# Patient Record
(demographics unavailable — no encounter records)

---

## 2025-01-10 NOTE — PHYSICAL EXAM
[Thrill] : thrill [Hand well perfused] : hand well perfused [Warm Extremities] : warm extremities [Normal] : coordination grossly intact, no focal deficits [Pulsatile Thrill] : no pulsatile thrill [Aneurysm] : no aneurysm [Bleeding] : no bleeding [Ulcer] : no ulcer [de-identified] : Intact

## 2025-01-10 NOTE — HISTORY OF PRESENT ILLNESS
[] : left radiocephalic fistula [FreeTextEntry1] : Patient is a 70-year-old female with ESRD currently on hemodialysis via left upper extremity AV who presents the office today for routine surveillance.  Patient denies any present issues with dialysis.  Patient with history of peripheral vascular disease status post left lower extremity stent.  Denies rest pain or claudication symptoms.  Denies tissue loss.  Patient with carotid stenosis status post endarterectomy.  Denies any focal neurological deficits.

## 2025-01-10 NOTE — DISCUSSION/SUMMARY
[FreeTextEntry1] : 70-year-old female with ESRD currently on hemodialysis via left radiocephalic AV fistula.  Duplex demonstrates a well-functioning AV fistula with no significant stenosis.  Will continue to monitor.  No intervention is required at this time.  Patient to follow-up in 3 to 4 months with fistula duplex.  Patient with history of peripheral vascular disease status post left lower extremity stent and carotid stenosis status post endarterectomy.  Patient to return to office for carotid and arterial duplex.

## 2025-04-18 NOTE — HISTORY OF PRESENT ILLNESS
[FreeTextEntry1] : Patient is a 70-year-old female with history significant for CVA, type 2 diabetes, carotid stenosis status post endarterectomy, and ESRD currently on hemodialysis via left upper extremity AV fistula presents to the office today for routine surveillance.   Patient reports left upper extremity pain.  Denies rest pain claudication symptoms.  Denies tissue loss.  Denies any focal neurological deficits.

## 2025-04-18 NOTE — PHYSICAL EXAM
[JVD] : no jugular venous distention  [Normal Breath Sounds] : Normal breath sounds [Normal Heart Sounds] : normal heart sounds [Normal Rate and Rhythm] : normal rate and rhythm [2+] : left 2+ [Ankle Swelling (On Exam)] : not present [Varicose Veins Of Lower Extremities] : not present [] : not present [Abdomen Masses] : No abdominal masses [No Rash or Lesion] : No rash or lesion [Skin Ulcer] : no ulcer [Alert] : alert [Calm] : calm [de-identified] : appears well  [de-identified] : No facial asymmetry noted, tongue is midline [de-identified] : Intact

## 2025-04-18 NOTE — ASSESSMENT
[FreeTextEntry1] : 70-year-old female with end-stage renal disease currently on hemodialysis via left radiocephalic AV fistula.  Duplex today demonstrates moderate inflow stenosis with inadequate flow.  Given these findings, patient to be scheduled for left arm fistulogram.  Patient with history of carotid stenosis status post right carotid endarterectomy.  Duplex demonstrates a widely patent right carotid endarterectomy.  Moderate stenosis in the left internal carotid artery is stable.  Patient is currently asymptomatic.  Follow-up 6 months for carotid duplex.  Patient with history of peripheral vascular disease s/p left SFA stent.  Patient is currently asymptomatic.  Patient to continue conservative management of carotid stenosis and peripheral vascular disease with aspirin daily.  Follow-up 3 months with arterial duplex.   [Arterial/Venous Disease] : arterial/venous disease [Medication Management] : medication management

## 2025-04-30 NOTE — PROCEDURE
[Resume diet] : resume diet [Site check for bleeding/hematoma/thrill/bruit] : Site check for bleeding/hematoma/thrill/bruit [Vital signs on admission the q 15 mins x2] : Vital signs on admission the q 15 mins x2 [FreeTextEntry1] : Left arm fistula/fistulogram/angioplasty

## 2025-04-30 NOTE — HISTORY OF PRESENT ILLNESS
[] : left radiocephalic fistula [FreeTextEntry1] : 1/10/2019 Dr. Bean [FreeTextEntry4] : yesterday  [FreeTextEntry5] : yesterday 7pm  [FreeTextEntry6] : Dr Cedeño

## 2025-04-30 NOTE — PAST MEDICAL HISTORY
[Major surgery] : Major surgery [Increasing age ( >40 years old)] : Increasing age ( >40 years old) [No therapy indicated for cases scheduled for less than one hour] : No therapy indicated for cases scheduled for less than one hour. [FreeTextEntry1] : Malignant Hyperthermia Screening Tool and Risk of Bleeding Assessment\par  \par  Ms. DANYELLE ARRINGTON denies family history of unexpected death following Anesthesia or Exercise.\par  Denies Family history of Malignant Hyperthermia, Muscle or Neuromuscular disorder and High Temperature following exercise.\par  \par  Ms. DANYELLE ARRINGTON denies history of Muscle Spasm, Dark or Chocolate - Colored urine and Unanticipated fever immediately following anesthesia or serious exercise. \par  Ms. ARRINGTON also denies bleeding tendencies/ Risks of Bleeding.\par

## 2025-07-25 NOTE — ASSESSMENT
[FreeTextEntry1] : Patient with renal failure on dialysis.  Left radiocephalic fistula is being used during dialysis with intermittent difficulty with cannulation.  Duplex shows inflow stenosis with inadequate flow.  Will schedule the patient for fistulogram and angioplasty.  Risks benefits and alternative modes of treatment were all discussed with the patient.  Risk of bleeding infection and loss of the fistula was discussed.